# Patient Record
Sex: FEMALE | Race: WHITE | Employment: FULL TIME | URBAN - METROPOLITAN AREA
[De-identification: names, ages, dates, MRNs, and addresses within clinical notes are randomized per-mention and may not be internally consistent; named-entity substitution may affect disease eponyms.]

---

## 2018-11-06 ENCOUNTER — OFFICE VISIT (OUTPATIENT)
Dept: FAMILY MEDICINE | Facility: CLINIC | Age: 38
End: 2018-11-06
Payer: OTHER GOVERNMENT

## 2018-11-06 VITALS
WEIGHT: 165 LBS | SYSTOLIC BLOOD PRESSURE: 108 MMHG | HEIGHT: 71 IN | TEMPERATURE: 98.4 F | HEART RATE: 98 BPM | OXYGEN SATURATION: 98 % | BODY MASS INDEX: 23.1 KG/M2 | DIASTOLIC BLOOD PRESSURE: 60 MMHG | RESPIRATION RATE: 16 BRPM

## 2018-11-06 DIAGNOSIS — J06.9 UPPER RESPIRATORY TRACT INFECTION, UNSPECIFIED TYPE: Primary | ICD-10-CM

## 2018-11-06 PROCEDURE — 99203 OFFICE O/P NEW LOW 30 MIN: CPT | Performed by: FAMILY MEDICINE

## 2018-11-06 RX ORDER — FLUTICASONE PROPIONATE 50 MCG
SPRAY, SUSPENSION (ML) NASAL
COMMUNITY
Start: 2018-10-17 | End: 2019-03-21

## 2018-11-06 RX ORDER — ALBUTEROL SULFATE 90 UG/1
AEROSOL, METERED RESPIRATORY (INHALATION)
Refills: 0 | COMMUNITY
Start: 2018-09-23 | End: 2019-03-21

## 2018-11-06 RX ORDER — CODEINE PHOSPHATE AND GUAIFENESIN 10; 100 MG/5ML; MG/5ML
SOLUTION ORAL
Qty: 236 ML | Refills: 0 | Status: SHIPPED | OUTPATIENT
Start: 2018-11-06 | End: 2019-03-21

## 2018-11-06 RX ORDER — AZITHROMYCIN 250 MG/1
TABLET, FILM COATED ORAL
Qty: 6 TABLET | Refills: 0 | Status: SHIPPED | OUTPATIENT
Start: 2018-11-06 | End: 2019-03-21

## 2018-11-06 NOTE — MR AVS SNAPSHOT
"              After Visit Summary   2018    Blanquita Gamez    MRN: [id number]           Patient Information     Date Of Birth          1980        Visit Information        Provider Department      2018 6:00 PM Dorothy Dhaliwal MD [facility]        Today's Diagnoses     Upper respiratory tract infection, unspecified type    -  1       Follow-ups after your visit        Follow-up notes from your care team     Return in about 1 week (around 2018), or if symptoms worsen or fail to improve.      Who to contact     If you have questions or need follow up information about today's clinic visit or your schedule please contact [facility] directly at [phone].  Normal or non-critical lab and imaging results will be communicated to you by MyChart, letter or phone within 4 business days after the clinic has received the results. If you do not hear from us within 7 days, please contact the clinic through [employer] or phone. If you have a critical or abnormal lab result, we will notify you by phone as soon as possible.  Submit refill requests through [employer] or call your pharmacy and they will forward the refill request to us. Please allow 3 business days for your refill to be completed.          Additional Information About Your Visit        [employer] Information     [employer] lets you send messages to your doctor, view your test results, renew your prescriptions, schedule appointments and more. To sign up, go to [email or web address] . Click on \"Log in\" on the left side of the screen, which will take you to the Welcome page. Then click on \"Sign up Now\" on the right side of the page.     You will be asked to enter the access code listed below, as well as some personal information. Please follow the directions to create your username and password.     Your access code is: [email or web address]  Expires: 2019  7:18 AM     Your access code will  in 90 " "days. If you need help or a new code, please call your [facility] or [phone].        Care EveryWhere ID     This is your Care EveryWhere ID. This could be used by other organizations to access your [facility] medical records  [id number]        Your Vitals Were     Pulse Temperature Respirations Height Pulse Oximetry BMI (Body Mass Index)    98 98.4  F (36.9  C) (Oral) 16 5' 11\" (1.803 m) 98% 23.01 kg/m2       Blood Pressure from Last 3 Encounters:   11/06/18 108/60    Weight from Last 3 Encounters:   11/06/18 165 lb (74.8 kg)              Today, you had the following     No orders found for display         Today's Medication Changes          These changes are accurate as of 11/6/18 11:59 PM.  If you have any questions, ask your nurse or doctor.               Start taking these medicines.        Dose/Directions    azithromycin 250 MG tablet   Commonly known as:  ZITHROMAX   Used for:  Upper respiratory tract infection, unspecified type   Started by:  Dorothy Dhaliwal MD        Two tablets first day, then one tablet daily for four days.   Quantity:  6 tablet   Refills:  0       guaiFENesin-codeine 100-10 MG/5ML Soln solution   Commonly known as:  ROBITUSSIN AC   Used for:  Upper respiratory tract infection, unspecified type   Started by:  Dorothy Dhaliwal MD        5-10 mL   Quantity:  236 mL   Refills:  0            Where to get your medicines      These medications were sent to [facility] - [place], [place] - [address]  [address], [place] [address]     Phone:  [phone]     azithromycin 250 MG tablet         Some of these will need a paper prescription and others can be bought over the counter.  Ask your nurse if you have questions.     Bring a paper prescription for each of these medications     guaiFENesin-codeine 100-10 MG/5ML Soln solution                Primary Care Provider Office Phone # Fax #    Dorothy Parada " MD Madhuri [phone]       [address] [place] [address]        Equal Access to Services     MARCUS FINE : Hadii aad ku hadcorrietahmina Figueroa, lupillotalya castañedamandyha, keny johnjaxsonda johnnymyriam, satish kaylain hayaan johnnymoise rodriguez laScarcharles serrato. So [facility] [phone].    ATENCIÓN: Si habla español, tiene a nieves disposición servicios gratuitos de asistencia lingüística. Llame al [phone].    We comply with applicable federal civil rights laws and [place] laws. We do not discriminate on the basis of race, color, national origin, age, disability, sex, sexual orientation, or gender identity.            Thank you!     Thank you for choosing [facility]  for your care. Our goal is always to provide you with excellent care. Hearing back from our patients is one way we can continue to improve our services. Please take a few minutes to complete the written survey that you may receive in the mail after your visit with us. Thank you!             Your Updated Medication List - Protect others around you: Learn how to safely use, store and throw away your medicines at www.disposemymeds.org.          This list is accurate as of 11/6/18 11:59 PM.  Always use your most recent med list.                   Brand Name Dispense Instructions for use Diagnosis    azithromycin 250 MG tablet    ZITHROMAX    6 tablet    Two tablets first day, then one tablet daily for four days.    Upper respiratory tract infection, unspecified type       fluticasone 50 MCG/ACT spray    FLONASE          guaiFENesin-codeine 100-10 MG/5ML Soln solution    ROBITUSSIN AC    236 mL    5-10 mL    Upper respiratory tract infection, unspecified type       PROAIR  (90 Base) MCG/ACT inhaler   Generic drug:  albuterol      INHALE 1-2 PUFFS PO Q 4 TO 6 H

## 2018-11-07 NOTE — PROGRESS NOTES
SUBJECTIVE:   Blanquita Gamez is a 38 year old female who presents to clinic today for the following health issues:    Cough     Onset: past 8 weeks     Fever: no     Chills/Sweats: no     Headache (location?): YES    Sinus Pressure:YES    Conjunctivitis:  no    Ear Pain: no    Rhinorrhea: YES    Congestion: YES    Sore Throat: no      Cough: YES    Wheeze: no     Decreased Appetite: no     Nausea: no     Vomiting: YES- due to coughing     Diarrhea:  no     Dysuria/Freq.: no     Fatigue/Achiness: no     Sick/Strep Exposure: no      Therapies Tried and outcome: steroid and antibiotic treatments with mild relief.     Presents for Follow-up after struggling with URI x 2 months.  Initially treated with albuterol and steroid with no change in symptoms.  The was given Tessalon Perles with no change.  The received course of Abx (?cephalosporin) and still no change in cough.  Cough is harsh and hurts in chest.  Multiple sick contacts with 2nd grade daughter Concepcion and works in  at [employer].  No fever or chills.  Increased fatigue.  Harsh, dry cough.    Problem list and histories reviewed & adjusted, as indicated.  Additional history: as documented    There is no problem list on file for this patient.    No past surgical history on file.    Social History   Substance Use Topics     Smoking status: Not on file     Smokeless tobacco: Not on file     Alcohol use Not on file     No family history on file.      Current Outpatient Prescriptions   Medication Sig Dispense Refill     azithromycin (ZITHROMAX) 250 MG tablet Two tablets first day, then one tablet daily for four days. 6 tablet 0     guaiFENesin-codeine (ROBITUSSIN AC) 100-10 MG/5ML SOLN solution 5-10 mL 236 mL 0     PROAIR  (90 Base) MCG/ACT inhaler INHALE 1-2 PUFFS PO Q 4 TO 6 H  0     fluticasone (FLONASE) 50 MCG/ACT spray        Allergies   Allergen Reactions     Penicillins      No lab results found.   BP Readings from Last 3 Encounters:   11/06/18  "108/60    Wt Readings from Last 3 Encounters:   11/06/18 165 lb (74.8 kg)                    Reviewed and updated as needed this visit by clinical staff       Reviewed and updated as needed this visit by Provider         ROS:  Constitutional, HEENT, cardiovascular, pulmonary, gi and gu systems are negative, except as otherwise noted.    OBJECTIVE:     /60 (BP Location: Right arm, Patient Position: Sitting, Cuff Size: Adult Regular)  Pulse 98  Temp 98.4  F (36.9  C) (Oral)  Resp 16  Ht 5' 11\" (1.803 m)  Wt 165 lb (74.8 kg)  SpO2 98%  BMI 23.01 kg/m2  Body mass index is 23.01 kg/(m^2).  GENERAL: healthy, alert and no distress  EYES: Eyes grossly normal to inspection, PERRL and conjunctivae and sclerae normal  HENT: ear canals and TM's normal, nose and mouth without ulcers or lesions  NECK: no adenopathy, no asymmetry, masses, or scars and thyroid normal to palpation  RESP: lungs clear to auscultation - no rales, rhonchi or wheezes, very harsh repetitive dry cough in office   CV: regular rate and rhythm, normal S1 S2, no S3 or S4, no murmur, click or rub, no peripheral edema and peripheral pulses strong  ABDOMEN: soft, nontender, no hepatosplenomegaly, no masses and bowel sounds normal  MS: no gross musculoskeletal defects noted, no edema  PSYCH: mentation appears normal, affect normal/bright    ASSESSMENT/PLAN:     1. Upper respiratory tract infection, unspecified type    - azithromycin (ZITHROMAX) 250 MG tablet; Two tablets first day, then one tablet daily for four days.  Dispense: 6 tablet; Refill: 0  - guaiFENesin-codeine (ROBITUSSIN AC) 100-10 MG/5ML SOLN solution; 5-10 mL  Dispense: 236 mL; Refill: 0    We will treat with Zpak and RobAC for cough suppression in the next days as infection resolves.  Continue with increased fluids and rest.  Follow-up at end of week if no change or worsening symptoms prn.    Dorothy Dhaliwal MD  [facility]  "

## 2018-11-14 ENCOUNTER — HEALTH MAINTENANCE LETTER (OUTPATIENT)
Age: 38
End: 2018-11-14

## 2019-02-28 ENCOUNTER — ANCILLARY PROCEDURE (OUTPATIENT)
Dept: GENERAL RADIOLOGY | Facility: CLINIC | Age: 39
End: 2019-02-28
Attending: NURSE PRACTITIONER
Payer: OTHER GOVERNMENT

## 2019-02-28 ENCOUNTER — OFFICE VISIT (OUTPATIENT)
Dept: FAMILY MEDICINE | Facility: CLINIC | Age: 39
End: 2019-02-28
Payer: OTHER GOVERNMENT

## 2019-02-28 VITALS
HEIGHT: 71 IN | SYSTOLIC BLOOD PRESSURE: 117 MMHG | DIASTOLIC BLOOD PRESSURE: 83 MMHG | TEMPERATURE: 98.2 F | HEART RATE: 67 BPM | OXYGEN SATURATION: 100 % | WEIGHT: 152.8 LBS | BODY MASS INDEX: 21.39 KG/M2 | RESPIRATION RATE: 22 BRPM

## 2019-02-28 DIAGNOSIS — S39.92XA INJURY OF COCCYX, INITIAL ENCOUNTER: ICD-10-CM

## 2019-02-28 DIAGNOSIS — S32.2XXA CLOSED FRACTURE OF COCCYX, INITIAL ENCOUNTER (H): Primary | ICD-10-CM

## 2019-02-28 PROCEDURE — 99213 OFFICE O/P EST LOW 20 MIN: CPT | Performed by: NURSE PRACTITIONER

## 2019-02-28 PROCEDURE — 72220 X-RAY EXAM SACRUM TAILBONE: CPT

## 2019-02-28 RX ORDER — CYCLOBENZAPRINE HCL 5 MG
5 TABLET ORAL 3 TIMES DAILY PRN
Qty: 30 TABLET | Refills: 0 | Status: SHIPPED | OUTPATIENT
Start: 2019-02-28 | End: 2019-03-21

## 2019-02-28 ASSESSMENT — MIFFLIN-ST. JEOR: SCORE: 1469.23

## 2019-02-28 NOTE — PROGRESS NOTES
SUBJECTIVE:   Blanquita Gamez is a 38 year old female who presents to clinic today for the following health issues:  Chief Complaint   Patient presents with     Fall     Tailbone Pain       Fall yesterday at home down the stairs, experiencing some tailbone pain. Ibuprofen for pain. Will also need dr note for work     Yesterday Blanquita slipped, fell on her butt/coccyx, and slid down a level of stairs.  This Happened at home.  She denies any other injuries except she did scrap an area on her left hand.    She Immediately had tailbone pain.  Since the injury she has been using Ice/ibuprofen.  She is standing more, not sitting, because sitting makes the pain worse.  She has been using ice/ibuprofen.  Today she is worried about a fracture of her tailbone.     Missing work today ([employer].    [employer]: up to date: 9/5/2017.      Problem list and histories reviewed & adjusted, as indicated.  Additional history: as documented    Patient Active Problem List   Diagnosis     Injury of coccyx, initial encounter     History reviewed. No pertinent surgical history.    Social History     Tobacco Use     Smoking status: Never Smoker     Smokeless tobacco: Never Used   Substance Use Topics     Alcohol use: Yes     Comment: 1 per week      History reviewed. No pertinent family history.      Current Outpatient Medications   Medication Sig Dispense Refill     cyclobenzaprine (FLEXERIL) 5 MG tablet Take 1 tablet (5 mg) by mouth 3 times daily as needed for muscle spasms 30 tablet 0     azithromycin (ZITHROMAX) 250 MG tablet Two tablets first day, then one tablet daily for four days. (Patient not taking: Reported on 2/28/2019) 6 tablet 0     fluticasone (FLONASE) 50 MCG/ACT spray        guaiFENesin-codeine (ROBITUSSIN AC) 100-10 MG/5ML SOLN solution 5-10 mL (Patient not taking: Reported on 2/28/2019) 236 mL 0     PROAIR  (90 Base) MCG/ACT inhaler INHALE 1-2 PUFFS PO Q 4 TO 6 H  0     Allergies   Allergen Reactions      "Penicillins      No lab results found.   BP Readings from Last 3 Encounters:   02/28/19 117/83   11/06/18 108/60    Wt Readings from Last 3 Encounters:   02/28/19 69.3 kg (152 lb 12.8 oz)   11/06/18 74.8 kg (165 lb)                  Labs reviewed in EPIC    Reviewed and updated as needed this visit by clinical staff  Tobacco  Allergies  Meds  Med Hx  Surg Hx  Fam Hx  Soc Hx      Reviewed and updated as needed this visit by Provider         ROS:  Constitutional, HEENT, cardiovascular, pulmonary, GI, , musculoskeletal, neuro, skin, endocrine and psych systems are negative, except as otherwise noted.    OBJECTIVE:     /83   Pulse 67   Temp 98.2  F (36.8  C) (Oral)   Resp 22   Ht 1.803 m (5' 11\")   Wt 69.3 kg (152 lb 12.8 oz)   SpO2 100%   BMI 21.31 kg/m    Body mass index is 21.31 kg/m .  GENERAL APPEARANCE: healthy, alert and no distress. Smiling.   SKIN: warm and dry. Superficial abrasion noted to left hand.  MS: low back/coccyx area without redness, swelling, bruising.  She c/o pain with palpation of lower coccyx area.  ROM of her low back is intact.  CWMS intact distally.   PSYCH: mentation appears normal and affect normal/bright.  Good eye contact.    Sacral/coccyx xray:  IMPRESSION: 3 views of the sacrum/coccyx suggest a nondisplaced and  nonangulated fracture near the junction of the sacrum and coccyx.    ASSESSMENT/PLAN:     (S32.2XXA) Closed fracture of coccyx, initial encounter (H)  (primary encounter diagnosis)  Comment: acute  Plan: see below.     (S39.92XA) Injury of coccyx, initial encounter  Comment:   Plan: XR Sacrum and Coccyx 2 Views, cyclobenzaprine         (FLEXERIL) 5 MG tablet          She declined a rx for pain relievers today.  She prefers a muscle relaxant.  He will take OTC pain reliever as needed for pain.  I encouraged her to avoid aggrevating activities.  Sit on padding/avoid hard surfaces.  Anticipate slow, steady resolution and follow up prn.         Lis TURNER" JAMIE Childress [facility]

## 2019-02-28 NOTE — PATIENT INSTRUCTIONS
Patient Education     Tailbone (Coccyx) Fracture    The tailbone, or coccyx, is at the bottom of your spine. It is possible to break (fracture) this bone when you fall and land in a seated position. This injury takes about 4 to 6 weeks to heal. Until then, it will be painful to sit and to have bowel movements.  Home care    Lying down or standing will often be more comfortable than sitting. When you must sit, use a doughnut pillow. This is sold at most pharmacies or surgical and orthopedic supply stores. You can also make a doughnut-shaped pillow using a 4-inch foam pad with the center cut out.    Apply an ice pack over the injured area for not more than 20 minutes. Do this every 1 to 2 hours for the first 24 to 48 hours. Keep using ice packs as needed to ease pain and swelling. To make an ice pack, put ice cubes in a plastic bag that seals at the top. Wrap the bag in a clean, thin towel or cloth. Never put ice or an ice pack directly on the skin.    You may use over-the-counter pain medicine, unless another pain medicine was prescribed. Talk with your provider before using these medicines if you have chronic liver or kidney disease or ever had a stomach ulcer or GI bleeding.    Keep your stools soft to prevent pain when having a bowel movement. Unless another medicine was prescribed, try the following:  ? If you are constipated: Use over-the-counter laxatives. Ask your pharmacist for recommendations for mild acting or stronger acting medicines  ? If you are not constipated: Use over-the-counter stool softeners to make passing stool less painful. Your pharmacist can suggest options. Adding fiber to your diet or using fiber supplements is a long-term solution to keep your stools soft and prevent constipation. Ask your pharmacist to recommend a fiber supplement.  Follow-up care  Follow up with your healthcare provider if your symptoms do not improve after 1 week.  If X-rays were taken, you will be notified of any new  findings that may affect your care.  When to seek medical advice  Call your healthcare provider right away if:    Pain becomes worse or spreads to your legs  Call 911  Call 911 if you have:    Weakness or numbness in one or both legs    Loss of control over bowels or bladder, or numbness in the groin area  Date Last Reviewed: 4/1/2018 2000-2018 [employer]. [address] [place], [place] [address]. All rights reserved. This information is not intended as a substitute for professional medical care. Always follow your healthcare professional's instructions.

## 2019-02-28 NOTE — LETTER
February 28, 2019      Blanquita Gamez  [address] [place] [address]        To Whom It May Concern,     Due to a medical condition, Blanquita is to be excused from work 2/28/2019 and 3/1/2019.      Sincerely,        JAMIE Graff CNP

## 2019-03-01 ENCOUNTER — TELEPHONE (OUTPATIENT)
Dept: FAMILY MEDICINE | Facility: CLINIC | Age: 39
End: 2019-03-01

## 2019-03-01 NOTE — TELEPHONE ENCOUNTER
Reason for Call:  Other call back    Detailed comments: pt states she would like to  a copy of her xray when she comes in Tuesday morning for her child appointment. .     Phone Number Patient can be reached at: Home number on file [phone] (home)    Best Time: asap    Can we leave a detailed message on this number? YES    Call taken on 3/1/2019 at 10:42 AM by Florina Veronica

## 2019-03-04 ENCOUNTER — TELEPHONE (OUTPATIENT)
Dept: FAMILY MEDICINE | Facility: CLINIC | Age: 39
End: 2019-03-04

## 2019-03-04 NOTE — TELEPHONE ENCOUNTER
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    Please advise.     Thanks! Latha Ny RN      "

## 2019-03-04 NOTE — TELEPHONE ENCOUNTER
Huddled with PCP who states she can write a note for patient when she's in tomorrow with her daughter & mom. No further action needed at this time, closing encounter.    Tiffani Pink

## 2019-03-04 NOTE — TELEPHONE ENCOUNTER
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     Patient would like to know if PCP could write a note for her or if CO would. If an appt is needed she'd like to come in Tuesday & see PCP. Daughter & her mother both have back to back appts at 4:40 pm.     Please advise, thank you!    Phone Number Patient can be reached at: Home number on file [phone] (home)    Best Time: anytime    Can we leave a detailed message on this number? YES    Call taken on 3/4/2019 at 8:14 AM by Tiffani Boateng

## 2019-03-06 ENCOUNTER — TELEPHONE (OUTPATIENT)
Dept: URGENT CARE | Facility: URGENT CARE | Age: 39
End: 2019-03-06

## 2019-03-06 NOTE — LETTER
March 6, 2019      Blanquita Gamez  [address] [place] [address]        To Whom It May Concern:    Blanquita is a patient under my medical care. Blanquita Gamez  was seen on 02/28/2019.  Please excuse her from work on March 7, 2019 through March 8, 2019 due to injury.        Sincerely,        Lis AYALA CNP

## 2019-03-06 NOTE — TELEPHONE ENCOUNTER
Cynthia Dhaliwal MD/ Lis Childress CNP;  Received call from patient. Patient states she is still experiencing pain and discomfort from coccyx fracture    Patient was given work excuse letter for 02/28/2019 and 03/01/2019, patient had vacation scheduled 03/04/2019-03/06/2019, but is not ready to go back to work.     Patient is requesting work excuse letter starting 03/07/2019. Patient was unable to specify return date, she states she would want provider recommendation on this    Patient states her daughter was seen by Cynthia Dhaliwal MD yesterday and they discussed injury and need for extended work leave    Patient will call HR department and have them fax any other forms needed to excuse patient    Work excuse letter cued    Please review/sign or advise    Thank You!  Antonieta Richards, NOEMI  Triage Nurse

## 2019-03-06 NOTE — TELEPHONE ENCOUNTER
I can give her the rest of this week ago.  As of today I can linsey her the rest of this week off.  If she thinks she will need more time after this week, please ask her to schedule an appointment to be seen by Dr. Dhaliwal on 3/8/2019 for additional time off/follow up recommendations.

## 2019-03-06 NOTE — TELEPHONE ENCOUNTER
Cynthia Dhaliwal MD,   Called patient, relayed provider message below. Patient is requesting input form Cynthia Dhaliwal MD to see if another office visit is warranted. Patient states she spoke with you at her office visit yesterday.    Please advise    MA/TC- Please place letter at  for patient's  to     Thank You!  Antonieta Richards, RN  Triage Nurse

## 2019-03-07 NOTE — TELEPHONE ENCOUNTER
Letter left up front for  & patient notified. Either patient herself or  will  letter & was advised to bring in a photo ID.    Tiffani Pink

## 2019-03-07 NOTE — TELEPHONE ENCOUNTER
"Routing to provider - Madhuri - please review and advise as appropriate    S: Letter request: short term disability    Do you want to offer letter or office visit?    B:  Office visit - [place] - 2/28/19 - closed fracture of coccyx    A:  She denies new or worsening symptoms - states overall feels the \"same\" - states she had mentioned her injury with Dr. Dhaliwal when other family had been seen and provider had offered disability letter as needed due to job and work requirements of frequent movement up and down, bending    She states she wasn't aware of any forms that needed to be completed - just a letter from provider      R: tentatively placed patient on provider schedule Friday 3/8/19 - will send message to provider for recommendation    Thank you  Mary Winters RN    "

## 2019-03-21 ENCOUNTER — ANCILLARY PROCEDURE (OUTPATIENT)
Dept: GENERAL RADIOLOGY | Facility: CLINIC | Age: 39
End: 2019-03-21
Attending: FAMILY MEDICINE
Payer: OTHER GOVERNMENT

## 2019-03-21 ENCOUNTER — OFFICE VISIT (OUTPATIENT)
Dept: FAMILY MEDICINE | Facility: CLINIC | Age: 39
End: 2019-03-21
Payer: OTHER GOVERNMENT

## 2019-03-21 VITALS
WEIGHT: 155 LBS | HEART RATE: 75 BPM | RESPIRATION RATE: 16 BRPM | SYSTOLIC BLOOD PRESSURE: 115 MMHG | DIASTOLIC BLOOD PRESSURE: 87 MMHG | OXYGEN SATURATION: 99 % | BODY MASS INDEX: 21.62 KG/M2 | TEMPERATURE: 98.1 F

## 2019-03-21 DIAGNOSIS — M79.675 PAIN OF TOE OF LEFT FOOT: ICD-10-CM

## 2019-03-21 DIAGNOSIS — S32.2XXD FRACTURE OF COCCYX, SUBSEQUENT ENCOUNTER FOR FRACTURE WITH ROUTINE HEALING: ICD-10-CM

## 2019-03-21 DIAGNOSIS — M79.675 PAIN OF TOE OF LEFT FOOT: Primary | ICD-10-CM

## 2019-03-21 PROCEDURE — 99214 OFFICE O/P EST MOD 30 MIN: CPT | Performed by: FAMILY MEDICINE

## 2019-03-21 PROCEDURE — 73660 X-RAY EXAM OF TOE(S): CPT | Mod: LT

## 2019-03-21 RX ORDER — CYCLOBENZAPRINE HCL 5 MG
5 TABLET ORAL 3 TIMES DAILY PRN
Qty: 45 TABLET | Refills: 1 | Status: SHIPPED | OUTPATIENT
Start: 2019-03-21

## 2019-03-21 RX ORDER — CLINDAMYCIN PHOSPHATE 10 UG/ML
LOTION TOPICAL
Refills: 5 | COMMUNITY
Start: 2019-03-03

## 2019-03-21 ASSESSMENT — PAIN SCALES - GENERAL: PAINLEVEL: MODERATE PAIN (5)

## 2019-03-21 NOTE — PROGRESS NOTES
SUBJECTIVE:   Blanquita Gamez is a 38 year old female who presents to clinic today for the following health issues:    CC: Follow-up coccyx fracture    HPI    Patient presents today for follow up after falling 2- with coccyx fracture.  She has been off work trying to recover as she has not been able to sit comfortably for long periods of time due to pain and spasm around tailbone.  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    She is doing slightly better today- using a donut pillow when sitting.  She requests refill of Flexeril.    Has noticed increased pain and swelling of LEFT fourth toe.  She thinks injury occurred at same time as coccyx fracture from the fall- but did not notice pain until recently.  Wound on LEFT MCP also slow to close.    Problem list and histories reviewed & adjusted, as indicated.  Additional history: as documented        Patient Active Problem List   Diagnosis     Injury of coccyx, initial encounter     History reviewed. No pertinent surgical history.    Social History     Tobacco Use     Smoking status: Never Smoker     Smokeless tobacco: Never Used   Substance Use Topics     Alcohol use: Yes     Comment: 1 per week      History reviewed. No pertinent family history.      Current Outpatient Medications   Medication Sig Dispense Refill     clindamycin (CLEOCIN T) 1 % external lotion APPLY TOPICALLY TO FACE QAM  5     Allergies   Allergen Reactions     Penicillins      Other reaction(s): *Unknown - Pt Doesn't Remember     No lab results found.   BP Readings from Last 3 Encounters:   03/21/19 115/87   02/28/19 117/83   11/06/18 108/60    Wt Readings from Last 3 Encounters:   03/21/19 70.3 kg (155 lb)   02/28/19 69.3 kg (152 lb 12.8 oz)   11/06/18 74.8 kg (165 lb)                    ROS:  Constitutional, HEENT, cardiovascular, pulmonary, gi and gu systems are negative, except as otherwise noted.    OBJECTIVE:     /87   Pulse 75    Temp 98.1  F (36.7  C)   Resp 16   Wt 70.3 kg (155 lb)   SpO2 99%   Breastfeeding? No   BMI 21.62 kg/m    Body mass index is 21.62 kg/m .  GENERAL: healthy, alert and no distress, standing for comfort  EYES: Eyes grossly normal to inspection, PERRL and conjunctivae and sclerae normal  NECK: no adenopathy, no asymmetry, masses, or scars and thyroid normal to palpation  MS: LEFT fourth toe is red and swollen in contrast to other toes, still painful to sit due to coccyx pain with stiff low back muscles  SKIN: no suspicious lesions or rashes  NEURO: Normal strength and tone, mentation intact and speech normal  PSYCH: mentation appears normal, affect normal/bright    Toe xray negative per my read    ASSESSMENT/PLAN:     1. Fracture of coccyx, subsequent encounter for fracture with routine healing    Continue with donut pillow when sitting, NSAIDs/FLEXERIL for spasms and ICE to coccyx with HEAT to low back.  Work restrictions written as she starts back on April 1st.  Follow-up end of April for recheck-- sooner prn.    2. Pain of toe of left foot    - XR Toe Left G/E 2 Views; Future    LEFT 4th toe torsten-taped to 3rd toe for comfort.  Continue with rest/ice/elevation prn comfort.    Dorothy Dhaliwal MD  [facility]

## 2019-04-24 ENCOUNTER — OFFICE VISIT (OUTPATIENT)
Dept: FAMILY MEDICINE | Facility: CLINIC | Age: 39
End: 2019-04-24
Payer: OTHER GOVERNMENT

## 2019-04-24 VITALS
SYSTOLIC BLOOD PRESSURE: 116 MMHG | RESPIRATION RATE: 16 BRPM | WEIGHT: 156 LBS | OXYGEN SATURATION: 100 % | DIASTOLIC BLOOD PRESSURE: 76 MMHG | BODY MASS INDEX: 21.76 KG/M2 | HEART RATE: 72 BPM | TEMPERATURE: 97.1 F

## 2019-04-24 DIAGNOSIS — J06.9 UPPER RESPIRATORY TRACT INFECTION, UNSPECIFIED TYPE: Primary | ICD-10-CM

## 2019-04-24 PROCEDURE — 99213 OFFICE O/P EST LOW 20 MIN: CPT | Performed by: FAMILY MEDICINE

## 2019-04-24 RX ORDER — AZITHROMYCIN 250 MG/1
TABLET, FILM COATED ORAL
Qty: 12 TABLET | Refills: 0 | Status: SHIPPED | OUTPATIENT
Start: 2019-04-24 | End: 2019-04-24

## 2019-04-24 RX ORDER — AZITHROMYCIN 250 MG/1
TABLET, FILM COATED ORAL
Qty: 6 TABLET | Refills: 0 | Status: SHIPPED | OUTPATIENT
Start: 2019-04-24 | End: 2019-08-05

## 2019-04-24 ASSESSMENT — PAIN SCALES - GENERAL: PAINLEVEL: MILD PAIN (2)

## 2019-04-24 NOTE — PROGRESS NOTES
azithromycin  SUBJECTIVE:   Blanquita Gamez is a 38 year old female who presents to clinic today for the following health issues:      HPI     Presents with increased cough and congestion.  Intermittent fever and chills.  Multiple sick exposures working in a  at [employer].  Mom also with same symptoms.    Additional history: as documented    Reviewed and updated as needed this visit by clinical staff  Tobacco  Allergies  Meds  Med Hx  Surg Hx  Fam Hx  Soc Hx        Reviewed and updated as needed this visit by Provider             Patient Active Problem List   Diagnosis     Injury of coccyx, initial encounter     History reviewed. No pertinent surgical history.    Social History     Tobacco Use     Smoking status: Never Smoker     Smokeless tobacco: Never Used   Substance Use Topics     Alcohol use: Yes     Comment: 1 per week      History reviewed. No pertinent family history.      Current Outpatient Medications   Medication Sig Dispense Refill     clindamycin (CLEOCIN T) 1 % external lotion APPLY TOPICALLY TO FACE QAM  5     cyclobenzaprine (FLEXERIL) 5 MG tablet Take 1 tablet (5 mg) by mouth 3 times daily as needed for muscle spasms 45 tablet 1     Allergies   Allergen Reactions     Penicillins      Other reaction(s): *Unknown - Pt Doesn't Remember     No lab results found.   BP Readings from Last 3 Encounters:   04/24/19 116/76   03/21/19 115/87   02/28/19 117/83    Wt Readings from Last 3 Encounters:   04/24/19 70.8 kg (156 lb)   03/21/19 70.3 kg (155 lb)   02/28/19 69.3 kg (152 lb 12.8 oz)                    ROS:  Constitutional, HEENT, cardiovascular, pulmonary, gi and gu systems are negative, except as otherwise noted.    OBJECTIVE:     /76   Pulse 72   Temp 97.1  F (36.2  C) (Oral)   Resp 16   Wt 70.8 kg (156 lb)   SpO2 100%   Breastfeeding? No   BMI 21.76 kg/m    Body mass index is 21.76 kg/m .  GENERAL: healthy, alert and no distress  EYES: Eyes grossly normal to inspection,  PERRL and conjunctivae and sclerae normal  HENT: ear canals and TM's normal, nose and mouth without ulcers or lesions  NECK: no adenopathy, no asymmetry, masses, or scars and thyroid normal to palpation  RESP: lungs clear to auscultation - no rales, rhonchi or wheezes, harsh congested cough  CV: regular rate and rhythm, normal S1 S2, no S3 or S4, no murmur, click or rub, no peripheral edema and peripheral pulses strong  ABDOMEN: soft, nontender, no hepatosplenomegaly, no masses and bowel sounds normal  MS: no gross musculoskeletal defects noted, no edema  PSYCH: mentation appears normal, affect normal/bright    ASSESSMENT/PLAN:     1. Upper respiratory tract infection, unspecified type    - azithromycin (ZITHROMAX) 250 MG tablet; Take 2 tabs day 1 then tablet days 2-11.  Dispense: 12 tablet; Refill: 0    Will treat with Zpak (additional Zpak for mom who is also patient of mine).  Continue with increased rest and fluids.  Close Follow-up if no change or worsening symptoms prn.    Dorothy Dhaliwal MD  [facility]

## 2019-06-06 DIAGNOSIS — Z00.00 ROUTINE GENERAL MEDICAL EXAMINATION AT A HEALTH CARE FACILITY: Primary | ICD-10-CM

## 2019-08-02 ASSESSMENT — ENCOUNTER SYMPTOMS
DYSURIA: 0
CHILLS: 0
CONSTIPATION: 0
ABDOMINAL PAIN: 0
HEARTBURN: 0
ARTHRALGIAS: 1
PARESTHESIAS: 0
HEMATURIA: 0
FEVER: 0
MYALGIAS: 1
HEMATOCHEZIA: 0
NERVOUS/ANXIOUS: 0
PALPITATIONS: 0
NAUSEA: 0
SHORTNESS OF BREATH: 0
FREQUENCY: 0
DIARRHEA: 0
SORE THROAT: 0
JOINT SWELLING: 0
BREAST MASS: 0
EYE PAIN: 0
WEAKNESS: 0
HEADACHES: 0
DIZZINESS: 0
COUGH: 0

## 2019-08-05 ENCOUNTER — OFFICE VISIT (OUTPATIENT)
Dept: FAMILY MEDICINE | Facility: CLINIC | Age: 39
End: 2019-08-05
Payer: OTHER GOVERNMENT

## 2019-08-05 VITALS
DIASTOLIC BLOOD PRESSURE: 78 MMHG | SYSTOLIC BLOOD PRESSURE: 110 MMHG | BODY MASS INDEX: 21.98 KG/M2 | RESPIRATION RATE: 16 BRPM | TEMPERATURE: 96.3 F | HEIGHT: 71 IN | HEART RATE: 80 BPM | WEIGHT: 157 LBS

## 2019-08-05 DIAGNOSIS — Z00.00 ROUTINE GENERAL MEDICAL EXAMINATION AT A HEALTH CARE FACILITY: Primary | ICD-10-CM

## 2019-08-05 DIAGNOSIS — Z12.4 SCREENING FOR CERVICAL CANCER: ICD-10-CM

## 2019-08-05 LAB
ALBUMIN SERPL-MCNC: 4.2 G/DL (ref 3.4–5)
ALP SERPL-CCNC: 42 U/L (ref 40–150)
ALT SERPL W P-5'-P-CCNC: 21 U/L (ref 0–50)
ANION GAP SERPL CALCULATED.3IONS-SCNC: 5 MMOL/L (ref 3–14)
AST SERPL W P-5'-P-CCNC: 21 U/L (ref 0–45)
BASOPHILS # BLD AUTO: 0 10E9/L (ref 0–0.2)
BASOPHILS NFR BLD AUTO: 0.2 %
BILIRUB SERPL-MCNC: 0.4 MG/DL (ref 0.2–1.3)
BUN SERPL-MCNC: 13 MG/DL (ref 7–30)
CALCIUM SERPL-MCNC: 8.7 MG/DL (ref 8.5–10.1)
CHLORIDE SERPL-SCNC: 109 MMOL/L (ref 94–109)
CHOLEST SERPL-MCNC: 185 MG/DL
CO2 SERPL-SCNC: 26 MMOL/L (ref 20–32)
CREAT SERPL-MCNC: 0.76 MG/DL (ref 0.52–1.04)
DIFFERENTIAL METHOD BLD: NORMAL
EOSINOPHIL # BLD AUTO: 0.1 10E9/L (ref 0–0.7)
EOSINOPHIL NFR BLD AUTO: 1.8 %
ERYTHROCYTE [DISTWIDTH] IN BLOOD BY AUTOMATED COUNT: 13 % (ref 10–15)
GFR SERPL CREATININE-BSD FRML MDRD: >90 ML/MIN/{1.73_M2}
GLUCOSE SERPL-MCNC: 96 MG/DL (ref 70–99)
HCT VFR BLD AUTO: 41.6 % (ref 35–47)
HDLC SERPL-MCNC: 74 MG/DL
HGB BLD-MCNC: 14.2 G/DL (ref 11.7–15.7)
LDLC SERPL CALC-MCNC: 87 MG/DL
LYMPHOCYTES # BLD AUTO: 1.5 10E9/L (ref 0.8–5.3)
LYMPHOCYTES NFR BLD AUTO: 29.2 %
MCH RBC QN AUTO: 30.2 PG (ref 26.5–33)
MCHC RBC AUTO-ENTMCNC: 34.1 G/DL (ref 31.5–36.5)
MCV RBC AUTO: 89 FL (ref 78–100)
MONOCYTES # BLD AUTO: 0.4 10E9/L (ref 0–1.3)
MONOCYTES NFR BLD AUTO: 7.8 %
NEUTROPHILS # BLD AUTO: 3.1 10E9/L (ref 1.6–8.3)
NEUTROPHILS NFR BLD AUTO: 61 %
NONHDLC SERPL-MCNC: 111 MG/DL
PLATELET # BLD AUTO: 266 10E9/L (ref 150–450)
POTASSIUM SERPL-SCNC: 4.3 MMOL/L (ref 3.4–5.3)
PROT SERPL-MCNC: 7.8 G/DL (ref 6.8–8.8)
RBC # BLD AUTO: 4.7 10E12/L (ref 3.8–5.2)
SODIUM SERPL-SCNC: 140 MMOL/L (ref 133–144)
TRIGL SERPL-MCNC: 122 MG/DL
WBC # BLD AUTO: 5.1 10E9/L (ref 4–11)

## 2019-08-05 PROCEDURE — 80053 COMPREHEN METABOLIC PANEL: CPT | Performed by: FAMILY MEDICINE

## 2019-08-05 PROCEDURE — 99395 PREV VISIT EST AGE 18-39: CPT | Performed by: FAMILY MEDICINE

## 2019-08-05 PROCEDURE — G0476 HPV COMBO ASSAY CA SCREEN: HCPCS | Performed by: FAMILY MEDICINE

## 2019-08-05 PROCEDURE — 80061 LIPID PANEL: CPT | Performed by: FAMILY MEDICINE

## 2019-08-05 PROCEDURE — 85025 COMPLETE CBC W/AUTO DIFF WBC: CPT | Performed by: FAMILY MEDICINE

## 2019-08-05 PROCEDURE — 36415 COLL VENOUS BLD VENIPUNCTURE: CPT | Performed by: FAMILY MEDICINE

## 2019-08-05 PROCEDURE — G0145 SCR C/V CYTO,THINLAYER,RESCR: HCPCS | Performed by: FAMILY MEDICINE

## 2019-08-05 RX ORDER — TRETINOIN 0.5 MG/G
CREAM TOPICAL
Refills: 11 | COMMUNITY
Start: 2019-07-16

## 2019-08-05 ASSESSMENT — ENCOUNTER SYMPTOMS
ABDOMINAL PAIN: 0
PARESTHESIAS: 0
MYALGIAS: 1
DIARRHEA: 0
CHILLS: 0
FEVER: 0
NAUSEA: 0
HEARTBURN: 0
WEAKNESS: 0
FREQUENCY: 0
HEMATOCHEZIA: 0
HEMATURIA: 0
COUGH: 0
DIZZINESS: 0
JOINT SWELLING: 0
HEADACHES: 0
SHORTNESS OF BREATH: 0
DYSURIA: 0
PALPITATIONS: 0
SORE THROAT: 0
ARTHRALGIAS: 1
EYE PAIN: 0
CONSTIPATION: 0
NERVOUS/ANXIOUS: 0
BREAST MASS: 0

## 2019-08-05 ASSESSMENT — MIFFLIN-ST. JEOR: SCORE: 1480.34

## 2019-08-05 ASSESSMENT — PAIN SCALES - GENERAL: PAINLEVEL: SEVERE PAIN (7)

## 2019-08-05 NOTE — PROGRESS NOTES
SUBJECTIVE:   CC: Blanquita Gamez is an 38 year old woman who presents for preventive health visit.     Healthy Habits:     Getting at least 3 servings of Calcium per day:  Yes    Bi-annual eye exam:  NO    Dental care twice a year:  Yes    Sleep apnea or symptoms of sleep apnea:  None    Diet:  Regular (no restrictions)    Frequency of exercise:  2-3 days/week    Duration of exercise:  45-60 minutes    Taking medications regularly:  Yes    Medication side effects:  Not applicable    PHQ-2 Total Score: 0    Additional concerns today:  Yes    Current Chronic Medical Conditions  None    Surgical History  None    Social History   at [employer].   to  Adrian.  Daughter Concepcion age 9.  Nonsmoker.  Former dancer.  Nonsmoker.    Increased tightness of RIGHT hip after coccyx fracture earlier this winter.    Today's PHQ-2 Score:   PHQ-2 ( 1999 Pfizer) 8/2/2019   Q1: Little interest or pleasure in doing things 0   Q2: Feeling down, depressed or hopeless 0   PHQ-2 Score 0   Q1: Little interest or pleasure in doing things Not at all   Q2: Feeling down, depressed or hopeless Not at all   PHQ-2 Score 0       Abuse: Current or Past(Physical, Sexual or Emotional)- No  Do you feel safe in your environment? Yes    Social History     Tobacco Use     Smoking status: Never Smoker     Smokeless tobacco: Never Used   Substance Use Topics     Alcohol use: Yes     Comment: 1 per week          Alcohol Use 8/2/2019   Prescreen: >3 drinks/day or >7 drinks/week? No       Reviewed orders with patient.  Reviewed health maintenance and updated orders accordingly - Yes  BP Readings from Last 3 Encounters:   04/24/19 116/76   03/21/19 115/87   02/28/19 117/83    Wt Readings from Last 3 Encounters:   04/24/19 70.8 kg (156 lb)   03/21/19 70.3 kg (155 lb)   02/28/19 69.3 kg (152 lb 12.8 oz)                  Patient Active Problem List   Diagnosis     Injury of coccyx, initial encounter     History reviewed. No  pertinent surgical history.    Social History     Tobacco Use     Smoking status: Never Smoker     Smokeless tobacco: Never Used   Substance Use Topics     Alcohol use: Yes     Comment: 1 per week      Family History   Problem Relation Age of Onset     Diabetes Maternal Grandmother      Cerebrovascular Disease Maternal Grandfather      Cerebrovascular Disease Other      Other Cancer Other      Unknown/Adopted Father         Type of Ms         Current Outpatient Medications   Medication Sig Dispense Refill     clindamycin (CLEOCIN T) 1 % external lotion APPLY TOPICALLY TO FACE QAM  5     cyclobenzaprine (FLEXERIL) 5 MG tablet Take 1 tablet (5 mg) by mouth 3 times daily as needed for muscle spasms 45 tablet 1     hydroquinone (ESOTERICA FADE NIGHTTIME) 2 % external cream        tretinoin (RETIN-A) 0.05 % external cream APPLY A PEA SIZED AMOUNT TO FACE ONCE DAILY AT BEDTIME  11     Allergies   Allergen Reactions     Penicillins      Other reaction(s): *Unknown - Pt Doesn't Remember     No lab results found.     Mammogram not appropriate for this patient based on age.    Pertinent mammograms are reviewed under the imaging tab.  History of abnormal Pap smear: NO - age 30-65 PAP every 5 years with negative HPV co-testing recommended     Reviewed and updated as needed this visit by clinical staff         Reviewed and updated as needed this visit by Provider            Review of Systems   Constitutional: Negative for chills and fever.   HENT: Negative for congestion, ear pain, hearing loss and sore throat.    Eyes: Negative for pain and visual disturbance.   Respiratory: Negative for cough and shortness of breath.    Cardiovascular: Negative for chest pain, palpitations and peripheral edema.   Gastrointestinal: Negative for abdominal pain, constipation, diarrhea, heartburn, hematochezia and nausea.   Breasts:  Negative for tenderness, breast mass and discharge.   Genitourinary: Negative for dysuria, frequency, genital  sores, hematuria, pelvic pain, urgency, vaginal bleeding and vaginal discharge.   Musculoskeletal: Positive for arthralgias and myalgias. Negative for joint swelling.   Skin: Negative for rash.   Neurological: Negative for dizziness, weakness, headaches and paresthesias.   Psychiatric/Behavioral: Negative for mood changes. The patient is not nervous/anxious.           OBJECTIVE:   There were no vitals taken for this visit.  Physical Exam  GENERAL: healthy, alert and no distress  EYES: Eyes grossly normal to inspection, PERRL and conjunctivae and sclerae normal  HENT: ear canals and TM's normal, nose and mouth without ulcers or lesions  NECK: no adenopathy, no asymmetry, masses, or scars and thyroid normal to palpation  RESP: lungs clear to auscultation - no rales, rhonchi or wheezes  BREAST: normal without masses, tenderness or nipple discharge and no palpable axillary masses or adenopathy  CV: regular rate and rhythm, normal S1 S2, no S3 or S4, no murmur, click or rub, no peripheral edema and peripheral pulses strong  ABDOMEN: soft, nontender, no hepatosplenomegaly, no masses and bowel sounds normal   (female): normal female external genitalia, normal urethral meatus, vaginal mucosa pink, moist, well rugated, and normal cervix/adnexa/uterus without masses or discharge  MS: no gross musculoskeletal defects noted, no edema  SKIN: no suspicious lesions or rashes  NEURO: Normal strength and tone, mentation intact and speech normal  PSYCH: mentation appears normal, affect normal/bright      ASSESSMENT/PLAN:   1. Routine general medical examination at a health care facility    - CBC with platelets differential  - Comprehensive metabolic panel  - Lipid panel reflex to direct LDL Fasting    Fasting labs today.  Continue excellent diet and exercise.  Annual mammograms to start at age 40.    2. Screening for cervical cancer    - Pap imaged thin layer screen with HPV - recommended age 30 - 65  - HPV High Risk Types DNA  "Cervical    PAP updated today.    Recommend CHIROPRACTOR for RIGHT hip tightness.    COUNSELING:  Reviewed preventive health counseling, as reflected in patient instructions       Regular exercise       Healthy diet/nutrition    Estimated body mass index is 21.76 kg/m  as calculated from the following:    Height as of 2/28/19: 1.803 m (5' 11\").    Weight as of 4/24/19: 70.8 kg (156 lb).         reports that she has never smoked. She has never used smokeless tobacco.      Counseling Resources:  ATP IV Guidelines  Pooled Cohorts Equation Calculator  Breast Cancer Risk Calculator  FRAX Risk Assessment  ICSI Preventive Guidelines  Dietary Guidelines for Americans, 2010  USDA's MyPlate  ASA Prophylaxis  Lung CA Screening    Dorothy Dhaliwal MD  [facility]  "

## 2019-08-07 LAB
COPATH REPORT: NORMAL
PAP: NORMAL

## 2019-08-08 LAB
FINAL DIAGNOSIS: NORMAL
HPV HR 12 DNA CVX QL NAA+PROBE: NEGATIVE
HPV16 DNA SPEC QL NAA+PROBE: NEGATIVE
HPV18 DNA SPEC QL NAA+PROBE: NEGATIVE
SPECIMEN DESCRIPTION: NORMAL
SPECIMEN SOURCE CVX/VAG CYTO: NORMAL

## 2019-08-09 NOTE — TELEPHONE ENCOUNTER
RECORDS RECEIVED FROM: Right Hip pain. history of fractured tailbone 02/19 - imaging of tailbone 02/19 at [place]. No hip imaging. no history of surgery. Referred by RYDER COTTON. appt per pt.    DATE RECEIVED: 8/9   NOTES STATUS DETAILS   OFFICE NOTE from referring provider Internal    OFFICE NOTE from other specialist Internal    DISCHARGE SUMMARY from hospital N/A    DISCHARGE REPORT from the ER N/A    OPERATIVE REPORT N/A    MEDICATION LIST Internal    IMPLANT RECORD/STICKER N/A    LABS     CBC/DIFF Internal    CULTURES N/A    INJECTIONS DONE IN RADIOLOGY N/A    MRI N/A    CT SCAN N/A    XRAYS (IMAGES & REPORTS) Internal SACRUM AND COCCYX    TUMOR     PATHOLOGY  Slides & report N/A

## 2019-08-14 ENCOUNTER — TELEPHONE (OUTPATIENT)
Dept: FAMILY MEDICINE | Facility: CLINIC | Age: 39
End: 2019-08-14

## 2019-08-14 ENCOUNTER — MYC MEDICAL ADVICE (OUTPATIENT)
Dept: FAMILY MEDICINE | Facility: CLINIC | Age: 39
End: 2019-08-14

## 2019-08-14 NOTE — LETTER
[facility]  [address] [place] [id number]  [phone]          August 14, 2019    Blanquita Gamez                                                                                                                     [facility] [place] [address]            To whom it may concern,    Blanquita Gamez is under my professional medical care. Blanquita is being treated for a coccyx injury and should be cautious with lifting/pushing/pulling.       Sincerely,         Addie Cantor DNP, CNP for Dorothy Dhaliwal MD/nh

## 2019-08-14 NOTE — TELEPHONE ENCOUNTER
Reason for Call:  Other call back and Referral for [facility]    Detailed comments: [facility] called and is requesting a referral to be sent to pt's insurance to [facility]. They cant see the pt on 8/16 without this referral sent to pt's insurance company. If any more information needed please call Ashley at [phone]. Please Advise thank you    Phone Number Patient can be reached at: Other phone number:  [phone]    Best Time: anytime    Can we leave a detailed message on this number? YES    Call taken on 8/14/2019 at 12:43 PM by Guera Connors

## 2019-08-14 NOTE — TELEPHONE ENCOUNTER
Triage--please place letter Yvonne DENISE DNP printed at reception desk on 8/15. Patient will .    Room to Yvonne DENISE DNP office was locked.    Thanks! Latha Ny RN

## 2019-08-14 NOTE — TELEPHONE ENCOUNTER
Patient requesting note with precautions and weight restrictions for recent tailbone injury    Pended note--please fill in information, review, edit and sign if agreeable to a note.    Thanks! Latha Ny RN

## 2019-08-16 ENCOUNTER — PRE VISIT (OUTPATIENT)
Dept: ORTHOPEDICS | Facility: CLINIC | Age: 39
End: 2019-08-16

## 2019-08-16 NOTE — TELEPHONE ENCOUNTER
Called Ashley and trying to reach the patient and find the  phone or fax number.  Nothing is listed in her media section with a number to [employer] that I can see.    HP reception:  Can you help find her insurance  phone or fax number so we can fax an ortho referral to them???   Cannot find and also left message for patient.  Kate Oscar RN

## 2019-08-19 NOTE — TELEPHONE ENCOUNTER
[facility] #: [phone]    Writer was told that they no longer accept faxes for referrals. Please see instructions below:    1.Go to [email or web address]  2.Type in WARF in the search engine.   3. Fill out for online form    Please place for [employer]AL [employer] TREAT.   Can be marked as urgent, routine, etc.   After submitting company will receive right away.       Kiera TORREZ     [facility]

## 2019-08-19 NOTE — TELEPHONE ENCOUNTER
Triage will attempt to do this referral on line.  Referral was successfully submitted on line to [facility].  Kate Oscar RN

## 2019-09-10 ENCOUNTER — ALLIED HEALTH/NURSE VISIT (OUTPATIENT)
Dept: NURSING | Facility: CLINIC | Age: 39
End: 2019-09-10
Payer: OTHER GOVERNMENT

## 2019-09-10 DIAGNOSIS — Z23 NEED FOR PROPHYLACTIC VACCINATION AND INOCULATION AGAINST INFLUENZA: Primary | ICD-10-CM

## 2019-09-10 PROCEDURE — 90686 IIV4 VACC NO PRSV 0.5 ML IM: CPT

## 2019-09-10 PROCEDURE — 90471 IMMUNIZATION ADMIN: CPT

## 2019-09-11 ENCOUNTER — MYC MEDICAL ADVICE (OUTPATIENT)
Dept: FAMILY MEDICINE | Facility: CLINIC | Age: 39
End: 2019-09-11

## 2019-09-23 NOTE — TELEPHONE ENCOUNTER
See 9/17/19 Telephone Encounter in daughter Bita chart. MRN: [id number]    Kassie Gil Referral Rep

## 2019-11-08 ENCOUNTER — MYC MEDICAL ADVICE (OUTPATIENT)
Dept: FAMILY MEDICINE | Facility: CLINIC | Age: 39
End: 2019-11-08

## 2019-11-10 ENCOUNTER — OFFICE VISIT (OUTPATIENT)
Dept: URGENT CARE | Facility: URGENT CARE | Age: 39
End: 2019-11-10
Payer: OTHER GOVERNMENT

## 2019-11-10 VITALS
WEIGHT: 157 LBS | DIASTOLIC BLOOD PRESSURE: 60 MMHG | HEIGHT: 71 IN | RESPIRATION RATE: 12 BRPM | OXYGEN SATURATION: 100 % | SYSTOLIC BLOOD PRESSURE: 110 MMHG | HEART RATE: 75 BPM | BODY MASS INDEX: 21.98 KG/M2 | TEMPERATURE: 97.7 F

## 2019-11-10 DIAGNOSIS — R05.9 COUGH: Primary | ICD-10-CM

## 2019-11-10 PROCEDURE — 87798 DETECT AGENT NOS DNA AMP: CPT | Performed by: PHYSICIAN ASSISTANT

## 2019-11-10 PROCEDURE — 99213 OFFICE O/P EST LOW 20 MIN: CPT | Performed by: PHYSICIAN ASSISTANT

## 2019-11-10 RX ORDER — CODEINE PHOSPHATE AND GUAIFENESIN 10; 100 MG/5ML; MG/5ML
1-2 SOLUTION ORAL EVERY 4 HOURS PRN
Qty: 120 ML | Refills: 0 | Status: SHIPPED | OUTPATIENT
Start: 2019-11-10

## 2019-11-10 RX ORDER — AZITHROMYCIN 250 MG/1
TABLET, FILM COATED ORAL
Qty: 6 TABLET | Refills: 0 | Status: SHIPPED | OUTPATIENT
Start: 2019-11-10 | End: 2020-02-14

## 2019-11-10 ASSESSMENT — MIFFLIN-ST. JEOR: SCORE: 1483.28

## 2019-11-11 NOTE — PROGRESS NOTES
"SUBJECTIVE:   Blanquita Gamez is a 39 year old female presenting with a chief complaint of sore throat and cough.  Patient has had symptoms like this in the past, treated last year for whooping cough and this reminds her of that.   Onset of symptoms was 4 week(s) ago.  Course of illness is worsening.    Severity moderate  Current and Associated symptoms: cough and sore throat  Treatment measures tried include OTC Cough med.  She has not had fever, chills, hemoptysis, pleurisy or weakness.     No past medical history on file.  Current Outpatient Medications   Medication Sig Dispense Refill     azithromycin (ZITHROMAX) 250 MG tablet Two tablets first day, then one tablet daily for four days. 6 tablet 0     guaiFENesin-codeine (ROBITUSSIN AC) 100-10 MG/5ML solution Take 5-10 mLs by mouth every 4 hours as needed for cough 120 mL 0     clindamycin (CLEOCIN T) 1 % external lotion APPLY TOPICALLY TO FACE QAM  5     cyclobenzaprine (FLEXERIL) 5 MG tablet Take 1 tablet (5 mg) by mouth 3 times daily as needed for muscle spasms 45 tablet 1     hydroquinone (ESOTERICA FADE NIGHTTIME) 2 % external cream        tretinoin (RETIN-A) 0.05 % external cream APPLY A PEA SIZED AMOUNT TO FACE ONCE DAILY AT BEDTIME  11     Social History     Tobacco Use     Smoking status: Never Smoker     Smokeless tobacco: Never Used   Substance Use Topics     Alcohol use: Yes     Comment: 1 per week        ROS:  Review of systems negative except as stated above.    OBJECTIVE:  /60   Pulse 75   Temp 97.7  F (36.5  C) (Oral)   Resp 12   Ht 1.803 m (5' 11\")   Wt 71.2 kg (157 lb)   LMP 10/27/2019   SpO2 100%   Breastfeeding? No   BMI 21.90 kg/m    GENERAL APPEARANCE: healthy, alert and no distress  EYES: EOMI,  PERRL, conjunctiva clear  HENT: ear canals and TM's normal.  Nose and mouth without ulcers, erythema or lesions  NECK: supple, nontender, no lymphadenopathy  RESP: lungs clear to auscultation - no rales, rhonchi or wheezes  CV: regular " rates and rhythm, normal S1 S2, no murmur noted  Ext: Negative homans sign B/L  NEURO: Normal strength and tone, sensory exam grossly normal,  normal speech and mentation  SKIN: no suspicious lesions or rashes    ASSESSMENT / PLAN:  1. Cough  Patient reports that she has had these symptoms last year and these feel about the same. She was diagnosed with whooping cough and treated with azithromycin.   I am not convinced that this illness / cough does not have allergy component, so encouraged her to use Zyrtec daily  Encouraged her to use humidified air in her room and push fluids  BLACK box warning discussed for robitussin AC  - B. pertussis/parapertussis PCR-NP  - guaiFENesin-codeine (ROBITUSSIN AC) 100-10 MG/5ML solution; Take 5-10 mLs by mouth every 4 hours as needed for cough  Dispense: 120 mL; Refill: 0  - azithromycin (ZITHROMAX) 250 MG tablet; Two tablets first day, then one tablet daily for four days.  Dispense: 6 tablet; Refill: 0    Diagnosis and treatment plan was reviewed with patient and/or family.   We went over any labs or imaging. Discussed worsening symptoms or little to no relief despite treatment plan to follow-up with PCP or return to clinic.  Patient verbalizes understanding. All questions were addressed and answered.   Carol Zamora PA-C

## 2019-11-12 LAB
B PARAPERT DNA SPEC QL NAA+PROBE: NOT DETECTED
B PERT DNA SPEC QL NAA+PROBE: NOT DETECTED
BORDETELLA COMMENT: NORMAL

## 2020-02-14 ENCOUNTER — OFFICE VISIT (OUTPATIENT)
Dept: URGENT CARE | Facility: URGENT CARE | Age: 40
End: 2020-02-14
Payer: OTHER GOVERNMENT

## 2020-02-14 VITALS
RESPIRATION RATE: 16 BRPM | HEART RATE: 72 BPM | BODY MASS INDEX: 21.7 KG/M2 | WEIGHT: 155 LBS | TEMPERATURE: 98.2 F | DIASTOLIC BLOOD PRESSURE: 70 MMHG | SYSTOLIC BLOOD PRESSURE: 110 MMHG | OXYGEN SATURATION: 98 % | HEIGHT: 71 IN

## 2020-02-14 DIAGNOSIS — R05.9 COUGH: Primary | ICD-10-CM

## 2020-02-14 LAB
FLUAV+FLUBV AG SPEC QL: NEGATIVE
FLUAV+FLUBV AG SPEC QL: NEGATIVE
SPECIMEN SOURCE: NORMAL

## 2020-02-14 PROCEDURE — 99213 OFFICE O/P EST LOW 20 MIN: CPT | Performed by: FAMILY MEDICINE

## 2020-02-14 PROCEDURE — 87804 INFLUENZA ASSAY W/OPTIC: CPT | Performed by: FAMILY MEDICINE

## 2020-02-14 RX ORDER — AZITHROMYCIN 250 MG/1
TABLET, FILM COATED ORAL
Qty: 6 TABLET | Refills: 0 | Status: SHIPPED | OUTPATIENT
Start: 2020-02-14

## 2020-02-14 RX ORDER — BENZONATATE 100 MG/1
100-200 CAPSULE ORAL 3 TIMES DAILY PRN
Qty: 50 CAPSULE | Refills: 0 | Status: SHIPPED | OUTPATIENT
Start: 2020-02-14

## 2020-02-14 ASSESSMENT — MIFFLIN-ST. JEOR: SCORE: 1474.21

## 2020-02-14 NOTE — PROGRESS NOTES
"Blanquita Gamez with presents with 8-9 days symptoms including sore throat, congestion, non productive cough, loss of voice. The other day had more severe achiness and fatigue. felt feverish. This is now gone. .    Treatment measures tried include Tylenol/Ibuprofen.    Exposure works in   no recent travel     Current Outpatient Medications   Medication Sig Dispense Refill     azithromycin (ZITHROMAX) 250 MG tablet Two tablets first day, then one tablet daily for four days. 6 tablet 0     clindamycin (CLEOCIN T) 1 % external lotion APPLY TOPICALLY TO FACE QAM  5     cyclobenzaprine (FLEXERIL) 5 MG tablet Take 1 tablet (5 mg) by mouth 3 times daily as needed for muscle spasms 45 tablet 1     guaiFENesin-codeine (ROBITUSSIN AC) 100-10 MG/5ML solution Take 5-10 mLs by mouth every 4 hours as needed for cough 120 mL 0     hydroquinone (ESOTERICA FADE NIGHTTIME) 2 % external cream        tretinoin (RETIN-A) 0.05 % external cream APPLY A PEA SIZED AMOUNT TO FACE ONCE DAILY AT BEDTIME  11       ROS otherwise negative for resp., ID,  HEENT symptoms.    Objective: /70   Pulse 72   Temp 98.2  F (36.8  C) (Oral)   Resp 16   Ht 1.803 m (5' 11\")   Wt 70.3 kg (155 lb)   LMP 02/14/2020   SpO2 98%   Breastfeeding No   BMI 21.62 kg/m    Exam:  GENERAL APPEARANCE: healthy, alert and no distress  EYES: Eyes grossly normal to inspection  HENT: ear canals and TM's normal and nose and mouth without ulcers or lesions  NECK: no adenopathy, no asymmetry, masses, or scars and thyroid normal to palpation  RESP: lungs clear to auscultation - no rales, rhonchi or wheezes  CV: regular rates and rhythm, normal S1 S2, no S3 or S4 and no murmur, click or rub -    Results for orders placed or performed in visit on 02/14/20   Influenza A/B antigen     Status: None   Result Value Ref Range    Influenza A/B Agn Specimen Nasal     Influenza A Negative NEG^Negative    Influenza B Negative NEG^Negative           ICD-10-CM    1. Cough " R05 Influenza A/B antigen     benzonatate (TESSALON) 100 MG capsule     azithromycin (ZITHROMAX) 250 MG tablet     possilbly bacterial sinus infection. Allergic to amox. No sign pneumonia.

## 2020-08-14 ENCOUNTER — MYC MEDICAL ADVICE (OUTPATIENT)
Dept: FAMILY MEDICINE | Facility: CLINIC | Age: 40
End: 2020-08-14

## 2021-01-03 ENCOUNTER — HEALTH MAINTENANCE LETTER (OUTPATIENT)
Age: 41
End: 2021-01-03

## 2021-04-15 ENCOUNTER — IMMUNIZATION (OUTPATIENT)
Dept: NURSING | Facility: CLINIC | Age: 41
End: 2021-04-15
Payer: OTHER GOVERNMENT

## 2021-04-15 PROCEDURE — 0001A PR COVID VAC PFIZER DIL RECON 30 MCG/0.3 ML IM: CPT

## 2021-04-15 PROCEDURE — 91300 PR COVID VAC PFIZER DIL RECON 30 MCG/0.3 ML IM: CPT

## 2021-05-06 ENCOUNTER — IMMUNIZATION (OUTPATIENT)
Dept: NURSING | Facility: CLINIC | Age: 41
End: 2021-05-06
Attending: INTERNAL MEDICINE
Payer: OTHER GOVERNMENT

## 2021-05-06 PROCEDURE — 0002A PR COVID VAC PFIZER DIL RECON 30 MCG/0.3 ML IM: CPT

## 2021-05-06 PROCEDURE — 91300 PR COVID VAC PFIZER DIL RECON 30 MCG/0.3 ML IM: CPT

## 2021-06-21 ENCOUNTER — MYC MEDICAL ADVICE (OUTPATIENT)
Dept: FAMILY MEDICINE | Facility: CLINIC | Age: 41
End: 2021-06-21

## 2021-06-21 DIAGNOSIS — Z12.31 VISIT FOR SCREENING MAMMOGRAM: Primary | ICD-10-CM

## 2021-08-19 ENCOUNTER — ANCILLARY PROCEDURE (OUTPATIENT)
Dept: MAMMOGRAPHY | Facility: CLINIC | Age: 41
End: 2021-08-19
Attending: FAMILY MEDICINE
Payer: OTHER GOVERNMENT

## 2021-08-19 DIAGNOSIS — Z12.31 VISIT FOR SCREENING MAMMOGRAM: ICD-10-CM

## 2021-08-19 PROCEDURE — 77063 BREAST TOMOSYNTHESIS BI: CPT | Mod: GC

## 2021-08-19 PROCEDURE — 77067 SCR MAMMO BI INCL CAD: CPT | Mod: GC

## 2021-10-10 ENCOUNTER — HEALTH MAINTENANCE LETTER (OUTPATIENT)
Age: 41
End: 2021-10-10

## 2021-10-26 ENCOUNTER — IMMUNIZATION (OUTPATIENT)
Dept: PEDIATRICS | Facility: CLINIC | Age: 41
End: 2021-10-26
Payer: OTHER GOVERNMENT

## 2021-10-26 DIAGNOSIS — Z23 NEED FOR PROPHYLACTIC VACCINATION AND INOCULATION AGAINST INFLUENZA: Primary | ICD-10-CM

## 2022-01-29 ENCOUNTER — HEALTH MAINTENANCE LETTER (OUTPATIENT)
Age: 42
End: 2022-01-29

## 2022-02-21 ENCOUNTER — TELEPHONE (OUTPATIENT)
Dept: FAMILY MEDICINE | Facility: CLINIC | Age: 42
End: 2022-02-21
Payer: OTHER GOVERNMENT

## 2022-09-18 ENCOUNTER — HEALTH MAINTENANCE LETTER (OUTPATIENT)
Age: 42
End: 2022-09-18

## 2023-05-07 ENCOUNTER — HEALTH MAINTENANCE LETTER (OUTPATIENT)
Age: 43
End: 2023-05-07

## 2024-02-25 ENCOUNTER — HEALTH MAINTENANCE LETTER (OUTPATIENT)
Age: 44
End: 2024-02-25

## 2024-07-14 ENCOUNTER — HEALTH MAINTENANCE LETTER (OUTPATIENT)
Age: 44
End: 2024-07-14